# Patient Record
Sex: MALE | Race: WHITE
[De-identification: names, ages, dates, MRNs, and addresses within clinical notes are randomized per-mention and may not be internally consistent; named-entity substitution may affect disease eponyms.]

---

## 2021-02-01 ENCOUNTER — HOSPITAL ENCOUNTER (EMERGENCY)
Dept: HOSPITAL 46 - ED | Age: 26
Discharge: HOME | End: 2021-02-01
Payer: COMMERCIAL

## 2021-02-01 VITALS — BODY MASS INDEX: 25.9 KG/M2 | WEIGHT: 165 LBS | HEIGHT: 67 IN

## 2021-02-01 DIAGNOSIS — D72.829: ICD-10-CM

## 2021-02-01 DIAGNOSIS — S80.12XA: ICD-10-CM

## 2021-02-01 DIAGNOSIS — S20.212A: ICD-10-CM
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
## 2021-02-01 DIAGNOSIS — S16.1XXA: Primary | ICD-10-CM
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
